# Patient Record
Sex: MALE | Race: WHITE | NOT HISPANIC OR LATINO | Employment: FULL TIME | URBAN - METROPOLITAN AREA
[De-identification: names, ages, dates, MRNs, and addresses within clinical notes are randomized per-mention and may not be internally consistent; named-entity substitution may affect disease eponyms.]

---

## 2017-02-13 RX ORDER — ATORVASTATIN CALCIUM 20 MG/1
TABLET, FILM COATED ORAL
Qty: 30 TAB | Refills: 5 | Status: SHIPPED | OUTPATIENT
Start: 2017-02-13 | End: 2017-09-05

## 2017-07-30 ENCOUNTER — HOSPITAL ENCOUNTER (EMERGENCY)
Facility: MEDICAL CENTER | Age: 37
End: 2017-07-30
Attending: EMERGENCY MEDICINE
Payer: MEDICAID

## 2017-07-30 VITALS
RESPIRATION RATE: 16 BRPM | WEIGHT: 196.43 LBS | BODY MASS INDEX: 29.77 KG/M2 | DIASTOLIC BLOOD PRESSURE: 62 MMHG | HEIGHT: 68 IN | HEART RATE: 101 BPM | TEMPERATURE: 98.1 F | OXYGEN SATURATION: 99 % | SYSTOLIC BLOOD PRESSURE: 120 MMHG

## 2017-07-30 DIAGNOSIS — K42.9 UMBILICAL HERNIA WITHOUT OBSTRUCTION AND WITHOUT GANGRENE: ICD-10-CM

## 2017-07-30 PROCEDURE — 99283 EMERGENCY DEPT VISIT LOW MDM: CPT

## 2017-07-30 RX ORDER — OXYCODONE HYDROCHLORIDE AND ACETAMINOPHEN 5; 325 MG/1; MG/1
1 TABLET ORAL EVERY 6 HOURS PRN
Qty: 15 TAB | Refills: 0 | Status: SHIPPED | OUTPATIENT
Start: 2017-07-30 | End: 2017-09-05

## 2017-07-30 ASSESSMENT — PAIN SCALES - GENERAL: PAINLEVEL_OUTOF10: 4

## 2017-07-30 NOTE — ED NOTES
Pt is  to female in room 58 who has been diagnosed with lice and being treated, doctor notified of relationship.  Charge RN notified of room being placed on terminal clean.

## 2017-07-30 NOTE — ED AVS SNAPSHOT
Home Care Instructions                                                                                                                Rosanna Danielle   MRN: [id number]    Department:  [facility], Emergency Dept   Date of Visit:  7/30/2017            [facility], Emergency Dept    [address] [facility] [address] [address]    Phone:  [phone]      You were seen by     Thalia Reddy M.D.      Your Diagnosis Was     Umbilical hernia without obstruction and without gangrene     K42.9       Follow-up Information     1. Follow up with [facility], Emergency Dept.    Specialty:  Emergency Medicine    Why:  As needed, If symptoms worsen    Contact information    [address] [address] [phone]        2. Follow up with KRYS Gusman.    Specialty:  Family Medicine    Why:  as scheduled on Tuesday    Contact information    [address]  [address] [address] [phone]          3. Follow up with Tobin Lewis M.D.. Schedule an appointment as soon as possible for a visit in 3 days.    Specialties:  Surgery, Radiology    Contact information    [address]  [address]  [facility] [address] [phone]        Medication Information     Review all of your home medications and newly ordered medications with your primary doctor and/or pharmacist as soon as possible. Follow medication instructions as directed by your doctor and/or pharmacist.     Please keep your complete medication list with you and share with your physician. Update the information when medications are discontinued, doses are changed, or new medications (including over-the-counter products) are added; and carry medication information at all times in the event of emergency situations.               Medication List      START taking these medications        Instructions    Morning Afternoon Evening Bedtime    oxycodone-acetaminophen 5-325 MG Tabs   Commonly known as:   PERCOCET        Take 1 Tab by mouth every 6 hours as needed for Severe Pain.   Dose:  1 Tab                          ASK your doctor about these medications        Instructions    Morning Afternoon Evening Bedtime    atorvastatin 20 MG Tabs   Commonly known as:  LIPITOR        TAKE ONE TABLET BY MOUTH ONCE DAILY                        azithromycin 250 MG Tabs   Commonly known as:  ZITHROMAX        Z pack-Take 2 tabs day one and then 1 tab daily for a total of 5 days                        Cholecalciferol 2000 UNIT Caps        Take 1 Cap by mouth every day.   Dose:  2000 Units                        ibuprofen 800 MG Tabs   Commonly known as:  MOTRIN        Take 1 Tab by mouth 2 times a day as needed for Mild Pain, Moderate Pain or Inflammation.   Dose:  800 mg                        tramadol 50 MG Tabs   Commonly known as:  ULTRAM        Take 1 Tab by mouth every 6 hours as needed (Mild pain (Pain score 1-3)).   Dose:  50 mg                             Where to Get Your Medications      You can get these medications from any pharmacy     Bring a paper prescription for each of these medications    - oxycodone-acetaminophen 5-325 MG Tabs              Discharge Instructions       Please follow up with a primary physician for blood pressure management, diabetic screening, and all other preventive health concerns.      RETURN FOR PAIN, VOMITING NOT CONTROLLED BY MEDICATIONS, FEVER, ANY OTHER CONCERN.  See your primary care provider as scheduled on Tuesday  Called Gen. surgery for follow-up regarding hernia repair. Dr. Lewis is on call--please call tomorrow.      Hernia, Adult  A hernia is the bulging of an organ or tissue through a weak spot in the muscles of the abdomen (abdominal wall). Hernias develop most often near the navel or groin.  There are many kinds of hernias. Common kinds include:  · Femoral hernia. This kind of hernia develops under the groin in the upper thigh area.  · Inguinal hernia. This kind of  hernia develops in the groin or scrotum.  · Umbilical hernia. This kind of hernia develops near the navel.  · Hiatal hernia. This kind of hernia causes part of the stomach to be pushed up into the chest.  · Incisional hernia. This kind of hernia bulges through a scar from an abdominal surgery.  CAUSES  This condition may be caused by:  · Heavy lifting.  · Coughing over a long period of time.  · Straining to have a bowel movement.  · An incision made during an abdominal surgery.  · A birth defect (congenital defect).  · Excess weight or obesity.  · Smoking.  · Poor nutrition.  · Cystic fibrosis.  · Excess fluid in the abdomen.  · Undescended testicles.  SYMPTOMS  Symptoms of a hernia include:  · A lump on the abdomen. This is the first sign of a hernia. The lump may become more obvious with standing, straining, or coughing. It may get bigger over time if it is not treated or if the condition causing it is not treated.  · Pain. A hernia is usually painless, but it may become painful over time if treatment is delayed. The pain is usually dull and may get worse with standing or lifting heavy objects.  Sometimes a hernia gets tightly squeezed in the weak spot (strangulated) or stuck there (incarcerated) and causes additional symptoms. These symptoms may include:  · Vomiting.  · Nausea.  · Constipation.  · Irritability.  DIAGNOSIS  A hernia may be diagnosed with:  · A physical exam. During the exam your health care provider may ask you to cough or to make a specific movement, because a hernia is usually more visible when you move.  · Imaging tests. These can include:  ¨ X-rays.  ¨ Ultrasound.  ¨ CT scan.  TREATMENT  A hernia that is small and painless may not need to be treated. A hernia that is large or painful may be treated with surgery. Inguinal hernias may be treated with surgery to prevent incarceration or strangulation. Strangulated hernias are always treated with surgery, because lack of blood to the trapped organ  or tissue can cause it to die.  Surgery to treat a hernia involves pushing the bulge back into place and repairing the weak part of the abdomen.  HOME CARE INSTRUCTIONS  · Avoid straining.  · Do not lift anything heavier than 10 lb (4.5 kg).  · Lift with your leg muscles, not your back muscles. This helps avoid strain.  · When coughing, try to cough gently.  · Prevent constipation. Constipation leads to straining with bowel movements, which can make a hernia worse or cause a hernia repair to break down. You can prevent constipation by:  ¨ Eating a high-fiber diet that includes plenty of fruits and vegetables.  ¨ Drinking enough fluids to keep your urine clear or pale yellow. Aim to drink 6-8 glasses of water per day.  ¨ Using a stool softener as directed by your health care provider.  · Lose weight, if you are overweight.  · Do not use any tobacco products, including cigarettes, chewing tobacco, or electronic cigarettes. If you need help quitting, ask your health care provider.  · Keep all follow-up visits as directed by your health care provider. This is important. Your health care provider may need to monitor your condition.  SEEK MEDICAL CARE IF:  · You have swelling, redness, and pain in the affected area.  · Your bowel habits change.  SEEK IMMEDIATE MEDICAL CARE IF:  · You have a fever.  · You have abdominal pain that is getting worse.  · You feel nauseous or you vomit.  · You cannot push the hernia back in place by gently pressing on it while you are lying down.  · The hernia:  ¨ Changes in shape or size.  ¨ Is stuck outside the abdomen.  ¨ Becomes discolored.  ¨ Feels hard or tender.     This information is not intended to replace advice given to you by your health care provider. Make sure you discuss any questions you have with your health care provider.     Document Released: 12/18/2006 Document Revised: 01/08/2016 Document Reviewed: 10/28/2015  Elsevier Interactive Patient Education ©2016 Elsevier  Inc.              Patient Information     Patient Information    Following emergency treatment: all patient requiring follow-up care must return either to a private physician or a clinic if your condition worsens before you are able to obtain further medical attention, please return to the emergency room.     Billing Information    At [facility], we work to make the billing process streamlined for our patients.  Our Representatives are here to answer any questions you may have regarding your hospital bill.  If you have insurance coverage and have supplied your insurance information to us, we will submit a claim to your insurer on your behalf.  Should you have any questions regarding your bill, we can be reached online or by phone as follows:  Online: You are able pay your bills online or live chat with our representatives about any billing questions you may have. We are here to help Monday - Friday from 8:00am to 7:30pm and 9:00am - 12:00pm on Saturdays.  Please visit [email or web address]  for more information.   Phone:  [phone] or [phone]    Please note that your emergency physician, surgeon, pathologist, radiologist, anesthesiologist, and other specialists are not employed by [facility] and will therefore bill separately for their services.  Please contact them directly for any questions concerning their bills at the numbers below:     Emergency Physician Services:  [phone]  [facility]:  [phone]  [facility]:  [phone]  [facility]:  [phone]    1. Your final bill may vary from the amount quoted upon discharge if all procedures are not complete at that time, or if your doctor has additional procedures of which we are not aware. You will receive an additional bill if you return to the Emergency Department at [facility] for suture removal regardless of the facility of which the sutures were placed.      2. Please arrange for settlement of this account at the emergency registration.    3. All self-pay accounts are due in full at the time of treatment.  If you are unable to meet this obligation then payment is expected within 4-5 days.     4. If you have had radiology studies (CT, X-ray, Ultrasound, MRI), you have received a preliminary result during your emergency department visit. Please contact the radiology department [phone] to receive a copy of your final result. Please discuss the Final result with your primary physician or with the follow up physician provided.     Crisis Hotline:  [place] Crisis Hotline:  7-303-JNQNJGD or 1-201.112.3428  [place] Crisis Hotline:    1-588.914.9092 or 381-965-9945         ED Discharge Follow Up Questions    1. In order to provide you with very good care, we would like to follow up with a phone call in the next few days.  May we have your permission to contact you?     YES /  NO    2. What is the best phone number to call you? (       )_____-__________    3. What is the best time to call you?      Morning  /  Afternoon  /  Evening                   Patient Signature:  ____________________________________________________________    Date:  ____________________________________________________________

## 2017-07-30 NOTE — DISCHARGE INSTRUCTIONS
Please follow up with a primary physician for blood pressure management, diabetic screening, and all other preventive health concerns.      RETURN FOR PAIN, VOMITING NOT CONTROLLED BY MEDICATIONS, FEVER, ANY OTHER CONCERN.  See your primary care provider as scheduled on Tuesday  Called Gen. surgery for follow-up regarding hernia repair. Dr. Lewis is on call--please call tomorrow.      Hernia, Adult  A hernia is the bulging of an organ or tissue through a weak spot in the muscles of the abdomen (abdominal wall). Hernias develop most often near the navel or groin.  There are many kinds of hernias. Common kinds include:  · Femoral hernia. This kind of hernia develops under the groin in the upper thigh area.  · Inguinal hernia. This kind of hernia develops in the groin or scrotum.  · Umbilical hernia. This kind of hernia develops near the navel.  · Hiatal hernia. This kind of hernia causes part of the stomach to be pushed up into the chest.  · Incisional hernia. This kind of hernia bulges through a scar from an abdominal surgery.  CAUSES  This condition may be caused by:  · Heavy lifting.  · Coughing over a long period of time.  · Straining to have a bowel movement.  · An incision made during an abdominal surgery.  · A birth defect (congenital defect).  · Excess weight or obesity.  · Smoking.  · Poor nutrition.  · Cystic fibrosis.  · Excess fluid in the abdomen.  · Undescended testicles.  SYMPTOMS  Symptoms of a hernia include:  · A lump on the abdomen. This is the first sign of a hernia. The lump may become more obvious with standing, straining, or coughing. It may get bigger over time if it is not treated or if the condition causing it is not treated.  · Pain. A hernia is usually painless, but it may become painful over time if treatment is delayed. The pain is usually dull and may get worse with standing or lifting heavy objects.  Sometimes a hernia gets tightly squeezed in the weak spot (strangulated) or stuck  there (incarcerated) and causes additional symptoms. These symptoms may include:  · Vomiting.  · Nausea.  · Constipation.  · Irritability.  DIAGNOSIS  A hernia may be diagnosed with:  · A physical exam. During the exam your health care provider may ask you to cough or to make a specific movement, because a hernia is usually more visible when you move.  · Imaging tests. These can include:  ¨ X-rays.  ¨ Ultrasound.  ¨ CT scan.  TREATMENT  A hernia that is small and painless may not need to be treated. A hernia that is large or painful may be treated with surgery. Inguinal hernias may be treated with surgery to prevent incarceration or strangulation. Strangulated hernias are always treated with surgery, because lack of blood to the trapped organ or tissue can cause it to die.  Surgery to treat a hernia involves pushing the bulge back into place and repairing the weak part of the abdomen.  HOME CARE INSTRUCTIONS  · Avoid straining.  · Do not lift anything heavier than 10 lb (4.5 kg).  · Lift with your leg muscles, not your back muscles. This helps avoid strain.  · When coughing, try to cough gently.  · Prevent constipation. Constipation leads to straining with bowel movements, which can make a hernia worse or cause a hernia repair to break down. You can prevent constipation by:  ¨ Eating a high-fiber diet that includes plenty of fruits and vegetables.  ¨ Drinking enough fluids to keep your urine clear or pale yellow. Aim to drink 6-8 glasses of water per day.  ¨ Using a stool softener as directed by your health care provider.  · Lose weight, if you are overweight.  · Do not use any tobacco products, including cigarettes, chewing tobacco, or electronic cigarettes. If you need help quitting, ask your health care provider.  · Keep all follow-up visits as directed by your health care provider. This is important. Your health care provider may need to monitor your condition.  SEEK MEDICAL CARE IF:  · You have swelling,  redness, and pain in the affected area.  · Your bowel habits change.  SEEK IMMEDIATE MEDICAL CARE IF:  · You have a fever.  · You have abdominal pain that is getting worse.  · You feel nauseous or you vomit.  · You cannot push the hernia back in place by gently pressing on it while you are lying down.  · The hernia:  ¨ Changes in shape or size.  ¨ Is stuck outside the abdomen.  ¨ Becomes discolored.  ¨ Feels hard or tender.     This information is not intended to replace advice given to you by your health care provider. Make sure you discuss any questions you have with your health care provider.     Document Released: 12/18/2006 Document Revised: 01/08/2016 Document Reviewed: 10/28/2015  [employer] Interactive Patient Education ©2016 Elsevier Inc.

## 2017-07-30 NOTE — ED AVS SNAPSHOT
Tomorrowish Access Code: Activation code not generated  Current Tomorrowish Status: Active    [employer]  A secure, online tool to manage your health information     [employer]’s Tomorrowish® is a secure, online tool that connects you to your personalized health information from the privacy of your home -- day or night - making it very easy for you to manage your healthcare. Once the activation process is completed, you can even access your medical information using the Tomorrowish haris, which is available for free in the Apple Haris store or Google Play store.     Tomorrowish provides the following levels of access (as shown below):   My Chart Features   [facility] Primary Care Doctor [facility]  Specialists [facility]  Urgent  Care Non-[facility]  Primary Care  Doctor   Email your healthcare team securely and privately 24/7 X X X X   Manage appointments: schedule your next appointment; view details of past/upcoming appointments X      Request prescription refills. X      View recent personal medical records, including lab and immunizations X X X X   View health record, including health history, allergies, medications X X X X   Read reports about your outpatient visits, procedures, consult and ER notes X X X X   See your discharge summary, which is a recap of your hospital and/or ER visit that includes your diagnosis, lab results, and care plan. X X       How to register for Tomorrowish:  1. Go to  [email or web address].  2. Click on the Sign Up Now box, which takes you to the New Member Sign Up page. You will need to provide the following information:  a. Enter your Tomorrowish Access Code exactly as it appears at the top of this page. (You will not need to use this code after you’ve completed the sign-up process. If you do not sign up before the expiration date, you must request a new code.)   b. Enter your date of birth.   c. Enter your home email address.   d. Click Submit, and follow the next screen’s instructions.  3. Create a Tomorrowish ID. This will  be your Sitesimon login ID and cannot be changed, so think of one that is secure and easy to remember.  4. Create a Sitesimon password. You can change your password at any time.  5. Enter your Password Reset Question and Answer. This can be used at a later time if you forget your password.   6. Enter your e-mail address. This allows you to receive e-mail notifications when new information is available in Sitesimon.  7. Click Sign Up. You can now view your health information.    For assistance activating your Sitesimon account, call [phone]

## 2017-07-30 NOTE — ED AVS SNAPSHOT
7/30/2017    Rosanna Danielle  [address]  [place] [address]    Dear Rosanna:    [facility] wants to ensure your discharge home is safe and you or your loved ones have had all of your questions answered regarding your care after you leave the hospital.    Below is a list of resources and contact information should you have any questions regarding your hospital stay, follow-up instructions, or active medical symptoms.    Questions or Concerns Regarding… Contact   Medical Questions Related to Your Discharge  (7 days a week, 8am-5pm) Contact a Nurse Care Coordinator   [phone]   Medical Questions Not Related to Your Discharge  (24 hours a day / 7 days a week)  Contact the Nurse Health Line   [phone]    Medications or Discharge Instructions Refer to your discharge packet   or contact your [facility] Primary Care Provider   [phone]   Follow-up Appointment(s) Schedule your appointment via [employer]   or contact Scheduling [phone]   Billing Review your statement via [employer]  or contact Billing [phone]   Medical Records Review your records via [employer]   or contact Medical Records [phone]     You may receive a telephone call within two days of discharge. This call is to make certain you understand your discharge instructions and have the opportunity to have any questions answered. You can also easily access your medical information, test results and upcoming appointments via the [employer] free online health management tool. You can learn more and sign up at Listiki/[employer]. For assistance setting up your [employer] account, please call [phone].    Once again, we want to ensure your discharge home is safe and that you have a clear understanding of any next steps in your care. If you have any questions or concerns, please do not hesitate to contact us, we are here for you. Thank you for choosing [facility] for your healthcare needs.    Sincerely,    Your [facility] Healthcare Team

## 2017-07-30 NOTE — ED NOTES
Pt ambulatory to triage c/o umbilical hernia x 1 month, progressively worse pain.  Pt states he was seen at an Urgent Care for same.
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
## 2017-09-05 ENCOUNTER — APPOINTMENT (OUTPATIENT)
Dept: ADMISSIONS | Facility: MEDICAL CENTER | Age: 37
End: 2017-09-05
Attending: SURGERY
Payer: MEDICAID

## 2017-09-05 RX ORDER — UBIDECARENONE 75 MG
100 CAPSULE ORAL DAILY
COMMUNITY

## 2017-09-13 ENCOUNTER — HOSPITAL ENCOUNTER (OUTPATIENT)
Facility: MEDICAL CENTER | Age: 37
End: 2017-09-13
Attending: SURGERY | Admitting: SURGERY
Payer: MEDICAID

## 2017-09-13 VITALS
SYSTOLIC BLOOD PRESSURE: 102 MMHG | HEIGHT: 68 IN | TEMPERATURE: 98.1 F | HEART RATE: 87 BPM | DIASTOLIC BLOOD PRESSURE: 61 MMHG | OXYGEN SATURATION: 97 % | BODY MASS INDEX: 30.67 KG/M2 | WEIGHT: 202.38 LBS | RESPIRATION RATE: 12 BRPM

## 2017-09-13 PROBLEM — K42.9 UMBILICAL HERNIA WITHOUT MENTION OF OBSTRUCTION OR GANGRENE: Status: RESOLVED | Noted: 2017-09-13 | Resolved: 2017-09-13

## 2017-09-13 PROBLEM — K42.9 UMBILICAL HERNIA WITHOUT MENTION OF OBSTRUCTION OR GANGRENE: Status: ACTIVE | Noted: 2017-09-13

## 2017-09-13 PROCEDURE — 160009 HCHG ANES TIME/MIN: Performed by: SURGERY

## 2017-09-13 PROCEDURE — 501838 HCHG SUTURE GENERAL: Performed by: SURGERY

## 2017-09-13 PROCEDURE — 160027 HCHG SURGERY MINUTES - 1ST 30 MINS LEVEL 2: Performed by: SURGERY

## 2017-09-13 PROCEDURE — 700101 HCHG RX REV CODE 250

## 2017-09-13 PROCEDURE — 160035 HCHG PACU - 1ST 60 MINS PHASE I: Performed by: SURGERY

## 2017-09-13 PROCEDURE — 160046 HCHG PACU - 1ST 60 MINS PHASE II: Performed by: SURGERY

## 2017-09-13 PROCEDURE — 160048 HCHG OR STATISTICAL LEVEL 1-5: Performed by: SURGERY

## 2017-09-13 PROCEDURE — 700111 HCHG RX REV CODE 636 W/ 250 OVERRIDE (IP)

## 2017-09-13 PROCEDURE — 160002 HCHG RECOVERY MINUTES (STAT): Performed by: SURGERY

## 2017-09-13 PROCEDURE — 700102 HCHG RX REV CODE 250 W/ 637 OVERRIDE(OP)

## 2017-09-13 PROCEDURE — 160036 HCHG PACU - EA ADDL 30 MINS PHASE I: Performed by: SURGERY

## 2017-09-13 PROCEDURE — 160025 RECOVERY II MINUTES (STATS): Performed by: SURGERY

## 2017-09-13 PROCEDURE — 160038 HCHG SURGERY MINUTES - EA ADDL 1 MIN LEVEL 2: Performed by: SURGERY

## 2017-09-13 PROCEDURE — A9270 NON-COVERED ITEM OR SERVICE: HCPCS

## 2017-09-13 PROCEDURE — C1781 MESH (IMPLANTABLE): HCPCS | Performed by: SURGERY

## 2017-09-13 PROCEDURE — 501411 HCHG SPONGE, BABY LAP W/O RINGS: Performed by: SURGERY

## 2017-09-13 DEVICE — MESH VENTRALEX ST W/STRAP - 4.3CM SMALL (1EA/CA): Type: IMPLANTABLE DEVICE | Status: FUNCTIONAL

## 2017-09-13 RX ORDER — OXYCODONE HCL 5 MG/5 ML
SOLUTION, ORAL ORAL
Status: COMPLETED
Start: 2017-09-13 | End: 2017-09-13

## 2017-09-13 RX ORDER — ONDANSETRON 2 MG/ML
INJECTION INTRAMUSCULAR; INTRAVENOUS
Status: COMPLETED
Start: 2017-09-13 | End: 2017-09-13

## 2017-09-13 RX ORDER — SODIUM CHLORIDE, SODIUM LACTATE, POTASSIUM CHLORIDE, CALCIUM CHLORIDE 600; 310; 30; 20 MG/100ML; MG/100ML; MG/100ML; MG/100ML
1000 INJECTION, SOLUTION INTRAVENOUS
Status: COMPLETED | OUTPATIENT
Start: 2017-09-13 | End: 2017-09-13

## 2017-09-13 RX ORDER — LIDOCAINE HYDROCHLORIDE 10 MG/ML
INJECTION, SOLUTION INFILTRATION; PERINEURAL
Status: COMPLETED
Start: 2017-09-13 | End: 2017-09-13

## 2017-09-13 RX ORDER — BUPIVACAINE HYDROCHLORIDE 5 MG/ML
INJECTION, SOLUTION EPIDURAL; INTRACAUDAL
Status: DISCONTINUED | OUTPATIENT
Start: 2017-09-13 | End: 2017-09-13 | Stop reason: HOSPADM

## 2017-09-13 RX ORDER — EPINEPHRINE 1 MG/ML(1)
AMPUL (ML) INJECTION
Status: DISCONTINUED | OUTPATIENT
Start: 2017-09-13 | End: 2017-09-13 | Stop reason: HOSPADM

## 2017-09-13 RX ADMIN — OXYCODONE HYDROCHLORIDE 10 MG: 5 SOLUTION ORAL at 08:08

## 2017-09-13 RX ADMIN — FENTANYL CITRATE 50 MCG: 50 INJECTION, SOLUTION INTRAMUSCULAR; INTRAVENOUS at 08:09

## 2017-09-13 RX ADMIN — LIDOCAINE HYDROCHLORIDE 0.2 ML: 10 INJECTION, SOLUTION INFILTRATION; PERINEURAL at 06:45

## 2017-09-13 RX ADMIN — FENTANYL CITRATE 25 MCG: 50 INJECTION, SOLUTION INTRAMUSCULAR; INTRAVENOUS at 08:16

## 2017-09-13 RX ADMIN — SODIUM CHLORIDE, SODIUM LACTATE, POTASSIUM CHLORIDE, CALCIUM CHLORIDE 1000 ML: 600; 310; 30; 20 INJECTION, SOLUTION INTRAVENOUS at 06:58

## 2017-09-13 RX ADMIN — ONDANSETRON 4 MG: 2 INJECTION INTRAMUSCULAR; INTRAVENOUS at 08:05

## 2017-09-13 ASSESSMENT — PAIN SCALES - GENERAL
PAINLEVEL_OUTOF10: 4
PAINLEVEL_OUTOF10: 8
PAINLEVEL_OUTOF10: 0
PAINLEVEL_OUTOF10: 0
PAINLEVEL_OUTOF10: ASSUMED PAIN PRESENT
PAINLEVEL_OUTOF10: 0
PAINLEVEL_OUTOF10: 0
PAINLEVEL_OUTOF10: 6

## 2017-09-13 NOTE — OR NURSING
0920: D/Hernandez to care of family post uneventful stay in PACU 2. Pt c/o tolerable pain in abdo area, but also pain in recent surgical site to leg. Sent home with copy of Dr Ritchie's Umbilical Hernia instructions.

## 2017-09-13 NOTE — DISCHARGE INSTRUCTIONS
ACTIVITY: Rest and take it easy for the first 24 hours.  A responsible adult is recommended to remain with you during that time.  It is normal to feel sleepy.  We encourage you to not do anything that requires balance, judgment or coordination.    MILD FLU-LIKE SYMPTOMS ARE NORMAL. YOU MAY EXPERIENCE GENERALIZED MUSCLE ACHES, THROAT IRRITATION, HEADACHE AND/OR SOME NAUSEA.    FOR 24 HOURS DO NOT:  Drive, operate machinery or run household appliances.  Drink beer or alcoholic beverages.   Make important decisions or sign legal documents.    SPECIAL INSTRUCTIONS: Avoid heavy lifting until follow up appt.    DIET: To avoid nausea, slowly advance diet as tolerated, avoiding spicy or greasy foods for the first day.  Add more substantial food to your diet according to your physician's instructions.  Babies can be fed formula or breast milk as soon as they are hungry.  INCREASE FLUIDS AND FIBER TO AVOID CONSTIPATION.    SURGICAL DRESSING/BATHING: Keep dressing clean dry and intact until further instruction from physician.     FOLLOW-UP APPOINTMENT:  A follow-up appointment should be arranged with your doctor as scheduled.    You should CALL YOUR PHYSICIAN if you develop:  Fever greater than 101 degrees F.  Pain not relieved by medication, or persistent nausea or vomiting.  Excessive bleeding (blood soaking through dressing) or unexpected drainage from the wound.  Extreme redness or swelling around the incision site, drainage of pus or foul smelling drainage.  Inability to urinate or empty your bladder within 8 hours.  Problems with breathing or chest pain.    You should call 911 if you develop problems with breathing or chest pain.  If you are unable to contact your doctor or surgical center, you should go to the nearest emergency room or urgent care center.  Physician's telephone #: [phone]    If any questions arise, call your doctor.  If your doctor is not available, please feel free to call the Surgical Center at  [phone].  The Center is open Monday through Friday from 7AM to 7PM.  You can also call the HEALTH HOTLINE open 24 hours/day, 7 days/week and speak to a nurse at [phone], or toll free at [phone].    A registered nurse may call you a few days after your surgery to see how you are doing after your procedure.    MEDICATIONS: Resume taking daily medication.  Take prescribed pain medication with food.  If no medication is prescribed, you may take non-aspirin pain medication if needed.  PAIN MEDICATION CAN BE VERY CONSTIPATING.  Take a stool softener or laxative such as senokot, pericolace, or milk of magnesia if needed.    Prescription given for N/A.  Last pain medication given at 0800am (Oxycodone 10mg).    If your physician has prescribed pain medication that includes Acetaminophen (Tylenol), do not take additional Acetaminophen (Tylenol) while taking the prescribed medication.    Depression / Suicide Risk    As you are discharged from this [facility] facility, it is important to learn how to keep safe from harming yourself.    Recognize the warning signs:  · Abrupt changes in personality, positive or negative- including increase in energy   · Giving away possessions  · Change in eating patterns- significant weight changes-  positive or negative  · Change in sleeping patterns- unable to sleep or sleeping all the time   · Unwillingness or inability to communicate  · Depression  · Unusual sadness, discouragement and loneliness  · Talk of wanting to die  · Neglect of personal appearance   · Rebelliousness- reckless behavior  · Withdrawal from people/activities they love  · Confusion- inability to concentrate     If you or a loved one observes any of these behaviors or has concerns about self-harm, here's what you can do:  · Talk about it- your feelings and reasons for harming yourself  · Remove any means that you might use to hurt yourself (examples: pills, rope, extension cords, firearm)  · Get  professional help from the community (Mental Health, Substance Abuse, psychological counseling)  · Do not be alone:Call your Safe Contact- someone whom you trust who will be there for you.  · Call your local CRISIS HOTLINE 004-2272 or 122-005-2356  · Call your local Children's Mobile Crisis Response Team [place] (650) 076-8843 or www.eCurv  · Call the toll free National Suicide Prevention Hotlines   · National Suicide Prevention Lifeline 708-150-ZWTK (4520)  · National Hope Line Network 800-SUICIDE (043-3217)

## 2017-09-13 NOTE — PROGRESS NOTES
Umbilical Hernia Repair Discharge Instructions:     1. ACTIVITIES: Upon discharge from the hospital, the day of surgery it is requested that you do no significant physical activity and limit mental activities, as you have had sedation. The day after surgery, you may resume activities of daily living, but for four weeks, it is recommended that you do no strenuous activities or heavy lifting (greater than 15 pounds).     2. DRIVING: You may drive whenever you are off pain medications and are able to perform the activities needed to drive, i.e. turning, bending, twisting, etc.     3. WOUND: It is not unusual for patients to experience swelling and even bruising, as well as a small amount of drainage at the hernia repair site. This is normal and will resolve over the next few days.     4. ICE: please use ice on the wound to decrease the swelling for the first 24 hours and then discontinue.     5. BATHING: The dressing can be removed two days after surgery and the wound can then be wetted in a shower as normal, but avoid submersion in water (tub bath) for at least a week. OK to shower with dressing in place.     6. PAIN MEDICATION: You will be given a prescription for pain medication at discharge. Please take these as directed. It is important to remember not to take medications on an empty stomach as this may cause nausea.     7. BOWEL FUNCTION: After hernia repair, it is not uncommon for patients to experience constipation. This is due to decreasing activity levels as well as pain medications. You may wish to use a stool softener beginning immediately after surgery, and you may or may not need to use a laxative (Milk of Magnesia, Ex-lax; Senokot, etc.) as well.     8 .CALL IF YOU HAVE: (1) Fevers to more than 1010 F, (2) Unusual chest or leg pain, (3) Drainage or fluid from incision that may be foul smelling, increased tenderness or soreness at the wound or the wound edges are no longer together,redness or swelling at  the incision site. Please do not hesitate to call with any other questions.     9. APPOINTMENT: Contact our office at [phone] for a follow-up appointment in 1 to 2 weeks following your procedure.     If you have any additional questions, please do not hesitate to call the office and speak to either myself or the physician on call.     Office addresses:   [address] [place] [address]   [address] [facility] [facility] [address] [place] [address]   Karlene Ritchie M.D.   [facility]   [phone]

## 2017-09-13 NOTE — H&P
Surgical History and Physical    Date: 9/13/2017    Requesting Physician: [unfilled]  PCP: Pcp Pt States None  Attending Physician: Karlene Ritchie M.D. [facility]    HPI: Rosanna Danielle has a symptomatic umbilical hernia, here for repair.     Past Medical History:   Diagnosis Date   • Bronchitis    • High cholesterol        Past Surgical History:   Procedure Laterality Date   • TIBIA PLATEAU ORIF Left 9/2/2016    Procedure: TIBIA PLATEAU ORIF, EXTERNAL FIXATOR REMOVAL;  Surgeon: Marcin Laws M.D.;  Location: SURGERY [facility];  Service:    • EXTERNAL FIXATOR APPLICATION Left 9/1/2016    Procedure: LEFT CLOSED REDUCTION TIBIA FRACTURE , PLACEMENT OF EXTERNAL FIXATOR;  Surgeon: David Ogden M.D.;  Location: SURGERY [facility];  Service:        No current facility-administered medications for this encounter.        Social History     Social History   • Marital status:      Spouse name: N/A   • Number of children: N/A   • Years of education: N/A     Occupational History   • Not on file.     Social History Main Topics   • Smoking status: Current Every Day Smoker     Packs/day: 1.00     Years: 17.00     Types: Cigarettes     Start date: 9/2/1999   • Smokeless tobacco: Never Used   • Alcohol use 1.8 oz/week     3 Standard drinks or equivalent per week   • Drug use: No      Comment: meth    • Sexual activity: Yes     Other Topics Concern   • Not on file     Social History Narrative   • No narrative on file       Family History   Problem Relation Age of Onset   • Alcohol/Drug Mother    • Diabetes Maternal Aunt    • Heart Disease Paternal Aunt    • Arthritis Maternal Grandmother    • Cancer Maternal Grandmother    • Hypertension Maternal Grandmother    • Hyperlipidemia Maternal Grandmother    • Stroke Maternal Grandmother    • Arthritis Paternal Grandmother        Allergies:  Apricot flavor; Food; and Peach [prunus persica]    Review of Systems:  Constitutional: Negative for fever,  "chills, weight loss, malaise/fatigue and diaphoresis.   HENT: Negative for hearing loss, ear pain, nosebleeds, congestion, sore throat, neck pain, tinnitus and ear discharge.    Eyes: Negative for blurred vision, double vision, photophobia, pain, discharge and redness.   Respiratory: Negative for cough, hemoptysis, sputum production, shortness of breath, wheezing and stridor.    Cardiovascular: Negative for chest pain, palpitations, orthopnea, claudication, leg swelling and PND.   Gastrointestinal: Negative for heartburn, nausea, vomiting, abdominal pain, diarrhea, constipation, blood in stool and melena.   Genitourinary: Negative for dysuria, urgency, frequency, hematuria and flank pain.   Musculoskeletal: Negative for myalgias, back pain, joint pain and falls.   Skin: Negative for itching and rash.  Neurological: Negative for dizziness, tingling, tremors, sensory change, speech change, focal weakness, seizures, loss of consciousness, weakness and headaches.   Endo/Heme/Allergies: Negative for environmental allergies and polydipsia. Does not bruise/bleed easily.   Psychiatric/Behavioral: Negative for depression, suicidal ideas, hallucinations, memory loss and substance abuse. The patient is not nervous/anxious and does not have insomnia.    Physical Exam:  Blood pressure 131/84, pulse 87, temperature 36.9 °C (98.4 °F), resp. rate 18, height 1.727 m (5' 7.99\"), weight 91.8 kg (202 lb 6.1 oz), SpO2 97 %.    Constitutional: he is oriented to person, place, and time.  he appears well-developed and well-nourished. No distress.   Head: Normocephalic and atraumatic.   Neck: Normal range of motion. Neck supple. No JVD present. No tracheal deviation present. No thyromegaly present.   Cardiovascular: Normal rate, regular rhythm, normal heart sounds and intact distal pulses.  Exam reveals no gallop and no friction rub.  No murmur heard.  Pulmonary/Chest: Effort normal and breath sounds normal. No stridor. No respiratory " distress. he has no wheezes. he has no rales.   Abdominal: Soft. Bowel sounds are normal. he exhibits no distension and no mass. There is no tenderness. There is no rebound and no guarding. +UHR  Musculoskeletal: Normal range of motion. he exhibits no edema and no tenderness.   Neurological: he is alert and oriented to person, place, and time. he has normal reflexes. No cranial nerve deficit. Coordination normal.   Skin: Skin is warm and dry. No rash noted. he is not diaphoretic. No erythema. No pallor.   Psychiatric: he has a normal mood and affect.  Behavior is normal.       Assessment: This is a 37 y.o. male with an umbilical hernia, symptomatic.    Plan: Umbilical hernia repair.    Thank you very much for this consultation.    Karlene Ritchie M.D.  [facility]  [phone]

## 2017-09-13 NOTE — OR NURSING
0756- pt to pacu via gurney with side rails up.  VSS.  Pt awake, agitated. Midline abd gauze and tegaderm dressing cdi.  C/o nausea, see mar for zofran given. L lower leg with ace wrap from hardware removal surgury on 9/11/17.  0800- Pt c/o pain, see mar for meds given.  0815- Pt more calm, relaxed.    0826- Vss.  Pt denies pain, nausea.  0842- wife updated via phone.  0854- Pt meets criteria for stage 2, report to Kathy ANAYA.

## 2018-04-05 ENCOUNTER — APPOINTMENT (OUTPATIENT)
Dept: RADIOLOGY | Facility: MEDICAL CENTER | Age: 38
End: 2018-04-05
Attending: EMERGENCY MEDICINE
Payer: COMMERCIAL

## 2018-04-05 ENCOUNTER — HOSPITAL ENCOUNTER (EMERGENCY)
Facility: MEDICAL CENTER | Age: 38
End: 2018-04-05
Attending: EMERGENCY MEDICINE
Payer: COMMERCIAL

## 2018-04-05 VITALS
TEMPERATURE: 98.6 F | SYSTOLIC BLOOD PRESSURE: 128 MMHG | HEART RATE: 78 BPM | DIASTOLIC BLOOD PRESSURE: 71 MMHG | WEIGHT: 180 LBS | OXYGEN SATURATION: 94 % | BODY MASS INDEX: 27.28 KG/M2 | HEIGHT: 68 IN | RESPIRATION RATE: 13 BRPM

## 2018-04-05 DIAGNOSIS — R55 SYNCOPE, UNSPECIFIED SYNCOPE TYPE: ICD-10-CM

## 2018-04-05 DIAGNOSIS — F10.920 ALCOHOLIC INTOXICATION WITHOUT COMPLICATION (HCC): ICD-10-CM

## 2018-04-05 LAB
ALBUMIN SERPL BCP-MCNC: 4.3 G/DL (ref 3.2–4.9)
ALBUMIN/GLOB SERPL: 1.3 G/DL
ALP SERPL-CCNC: 74 U/L (ref 30–99)
ALT SERPL-CCNC: 44 U/L (ref 2–50)
AMPHET UR QL SCN: NEGATIVE
ANION GAP SERPL CALC-SCNC: 8 MMOL/L (ref 0–11.9)
AST SERPL-CCNC: 28 U/L (ref 12–45)
BARBITURATES UR QL SCN: NEGATIVE
BASOPHILS # BLD AUTO: 0.5 % (ref 0–1.8)
BASOPHILS # BLD: 0.04 K/UL (ref 0–0.12)
BENZODIAZ UR QL SCN: NEGATIVE
BILIRUB SERPL-MCNC: 0.2 MG/DL (ref 0.1–1.5)
BUN SERPL-MCNC: 13 MG/DL (ref 8–22)
BZE UR QL SCN: NEGATIVE
CALCIUM SERPL-MCNC: 9.6 MG/DL (ref 8.5–10.5)
CANNABINOIDS UR QL SCN: NEGATIVE
CHLORIDE SERPL-SCNC: 108 MMOL/L (ref 96–112)
CO2 SERPL-SCNC: 25 MMOL/L (ref 20–33)
CREAT SERPL-MCNC: 0.99 MG/DL (ref 0.5–1.4)
EKG IMPRESSION: NORMAL
EKG IMPRESSION: NORMAL
EOSINOPHIL # BLD AUTO: 0.13 K/UL (ref 0–0.51)
EOSINOPHIL NFR BLD: 1.6 % (ref 0–6.9)
ERYTHROCYTE [DISTWIDTH] IN BLOOD BY AUTOMATED COUNT: 44.3 FL (ref 35.9–50)
ETHANOL BLD-MCNC: 0.21 G/DL
GLOBULIN SER CALC-MCNC: 3.2 G/DL (ref 1.9–3.5)
GLUCOSE SERPL-MCNC: 99 MG/DL (ref 65–99)
HCT VFR BLD AUTO: 43.7 % (ref 42–52)
HGB BLD-MCNC: 14.9 G/DL (ref 14–18)
IMM GRANULOCYTES # BLD AUTO: 0.02 K/UL (ref 0–0.11)
IMM GRANULOCYTES NFR BLD AUTO: 0.2 % (ref 0–0.9)
INR PPP: 1.04 (ref 0.87–1.13)
LYMPHOCYTES # BLD AUTO: 2.74 K/UL (ref 1–4.8)
LYMPHOCYTES NFR BLD: 33.7 % (ref 22–41)
MCH RBC QN AUTO: 32.3 PG (ref 27–33)
MCHC RBC AUTO-ENTMCNC: 34.1 G/DL (ref 33.7–35.3)
MCV RBC AUTO: 94.6 FL (ref 81.4–97.8)
METHADONE UR QL SCN: NEGATIVE
MONOCYTES # BLD AUTO: 0.69 K/UL (ref 0–0.85)
MONOCYTES NFR BLD AUTO: 8.5 % (ref 0–13.4)
NEUTROPHILS # BLD AUTO: 4.51 K/UL (ref 1.82–7.42)
NEUTROPHILS NFR BLD: 55.5 % (ref 44–72)
NRBC # BLD AUTO: 0 K/UL
NRBC BLD-RTO: 0 /100 WBC
OPIATES UR QL SCN: POSITIVE
OXYCODONE UR QL SCN: NEGATIVE
PCP UR QL SCN: NEGATIVE
PLATELET # BLD AUTO: 330 K/UL (ref 164–446)
PMV BLD AUTO: 10.7 FL (ref 9–12.9)
POTASSIUM SERPL-SCNC: 3.5 MMOL/L (ref 3.6–5.5)
PROPOXYPH UR QL SCN: NEGATIVE
PROT SERPL-MCNC: 7.5 G/DL (ref 6–8.2)
PROTHROMBIN TIME: 13.3 SEC (ref 12–14.6)
RBC # BLD AUTO: 4.62 M/UL (ref 4.7–6.1)
SODIUM SERPL-SCNC: 141 MMOL/L (ref 135–145)
TROPONIN I SERPL-MCNC: <0.01 NG/ML (ref 0–0.04)
TROPONIN I SERPL-MCNC: <0.01 NG/ML (ref 0–0.04)
WBC # BLD AUTO: 8.1 K/UL (ref 4.8–10.8)

## 2018-04-05 PROCEDURE — 94760 N-INVAS EAR/PLS OXIMETRY 1: CPT

## 2018-04-05 PROCEDURE — 93005 ELECTROCARDIOGRAM TRACING: CPT | Performed by: EMERGENCY MEDICINE

## 2018-04-05 PROCEDURE — 85025 COMPLETE CBC W/AUTO DIFF WBC: CPT

## 2018-04-05 PROCEDURE — 93005 ELECTROCARDIOGRAM TRACING: CPT

## 2018-04-05 PROCEDURE — 71045 X-RAY EXAM CHEST 1 VIEW: CPT

## 2018-04-05 PROCEDURE — A9270 NON-COVERED ITEM OR SERVICE: HCPCS | Performed by: EMERGENCY MEDICINE

## 2018-04-05 PROCEDURE — 96372 THER/PROPH/DIAG INJ SC/IM: CPT

## 2018-04-05 PROCEDURE — 85610 PROTHROMBIN TIME: CPT

## 2018-04-05 PROCEDURE — 700111 HCHG RX REV CODE 636 W/ 250 OVERRIDE (IP): Performed by: EMERGENCY MEDICINE

## 2018-04-05 PROCEDURE — 84484 ASSAY OF TROPONIN QUANT: CPT

## 2018-04-05 PROCEDURE — 80053 COMPREHEN METABOLIC PANEL: CPT

## 2018-04-05 PROCEDURE — 36415 COLL VENOUS BLD VENIPUNCTURE: CPT

## 2018-04-05 PROCEDURE — 700102 HCHG RX REV CODE 250 W/ 637 OVERRIDE(OP): Performed by: EMERGENCY MEDICINE

## 2018-04-05 PROCEDURE — 80307 DRUG TEST PRSMV CHEM ANLYZR: CPT

## 2018-04-05 PROCEDURE — 99285 EMERGENCY DEPT VISIT HI MDM: CPT

## 2018-04-05 RX ORDER — KETOROLAC TROMETHAMINE 30 MG/ML
15 INJECTION, SOLUTION INTRAMUSCULAR; INTRAVENOUS ONCE
Status: COMPLETED | OUTPATIENT
Start: 2018-04-05 | End: 2018-04-05

## 2018-04-05 RX ORDER — KETOROLAC TROMETHAMINE 30 MG/ML
15 INJECTION, SOLUTION INTRAMUSCULAR; INTRAVENOUS ONCE
Status: DISCONTINUED | OUTPATIENT
Start: 2018-04-05 | End: 2018-04-05

## 2018-04-05 RX ADMIN — KETOROLAC TROMETHAMINE 15 MG: 30 INJECTION, SOLUTION INTRAMUSCULAR; INTRAVENOUS at 18:11

## 2018-04-05 RX ADMIN — LIDOCAINE HYDROCHLORIDE 30 ML: 20 SOLUTION OROPHARYNGEAL at 18:11

## 2018-04-05 ASSESSMENT — PAIN SCALES - GENERAL: PAINLEVEL_OUTOF10: ASSUMED PAIN PRESENT

## 2018-04-05 NOTE — ED TRIAGE NOTES
"Chief Complaint   Patient presents with   • Chest Pain   • Syncope   • Alcohol Intoxication     Patient bib LUISA, states he tried to stop drinking for the past 3 days, however today he took 6 shots back to back of \"  99 Grapes\" alcohol, and 2 Vicodin. Wife said she heard him pass out when getting out of his shower. Patient now awake, c/o chest pain and left knee pain. EKG completed. ERP to eval.    "

## 2018-04-05 NOTE — ED PROVIDER NOTES
"CHIEF COMPLAINT  Chief Complaint   Patient presents with   • Chest Pain   • Syncope   • Alcohol Intoxication       HPI  Rosanna Danielle is a 37 y.o. male who presents with epigastric and central chest pain with syncopal episode earlier today. States that he has had one other episode of syncope approximately 2 weeks ago. States he was not drinking at that time. Denies prior cardiac history. Denies any trauma as a result of the syncopal episode. Significant other at bedside states that he passed out and slept on the floor. The patient does admit to drinking 8 shots of 99 proof alcohol since coming home from work this morning at around 9 AM. Also took 2 Vicodin for chronic knee pain to the left. The patient is slurring his speech currently. Denies nausea or vomiting.      REVIEW OF SYSTEMS  See HPI for further details. All other systems are negative.     PAST MEDICAL HISTORY   has a past medical history of Bronchitis and High cholesterol.    SOCIAL HISTORY  Social History     Social History Main Topics   • Smoking status: Current Every Day Smoker     Packs/day: 1.00     Years: 17.00     Types: Cigarettes     Start date: 9/2/1999   • Smokeless tobacco: Never Used   • Alcohol use 1.8 oz/week     3 Standard drinks or equivalent per week   • Drug use: No      Comment: meth    • Sexual activity: Yes       SURGICAL HISTORY   has a past surgical history that includes external fixator application (Left, 9/1/2016); tibia plateau orif (Left, 9/2/2016); and umbilical hernia repair (9/13/2017).    CURRENT MEDICATIONS  Home Medications    **Home medications have not yet been reviewed for this encounter**         ALLERGIES  Allergies   Allergen Reactions   • Apricot Flavor Anaphylaxis     Throat swells   • Food Anaphylaxis     Nectarine: Throat swells   • Peach [Prunus Persica] Anaphylaxis     Throat swells       PHYSICAL EXAM  VITAL SIGNS: /71   Pulse 81   Temp 37 °C (98.6 °F)   Resp 16   Ht 1.727 m (5' 8\")   Wt 81.6 " kg (180 lb)   SpO2 98%   BMI 27.37 kg/m²   Pulse ox interpretation: I interpret this pulse ox as normal.  Constitutional: Alert in no apparent distress.  HENT: No signs of trauma, Bilateral external ears normal, Nose normal.   Eyes: Pupils are equal and reactive, Conjunctiva normal, Non-icteric.   Neck: Normal range of motion, No tenderness, Supple, No stridor.   Cardiovascular: Regular rate and rhythm, no murmurs.   Thorax & Lungs: Normal breath sounds, No respiratory distress, No wheezing, No chest tenderness.   Abdomen: Bowel sounds normal, Soft, No tenderness, No masses, No pulsatile masses. No peritoneal signs.  Skin: Warm, Dry, No erythema, No rash.   Back: No bony tenderness, No CVA tenderness.   Extremities: Intact distal pulses, No edema, No tenderness, No cyanosis  Musculoskeletal: Good range of motion in all major joints. Old surgical scars to the left knee. No significant effusion or erythema. No acute deformity.  Neurologic: Alert,  Slurred speech, Normal motor function and gait, Normal sensory function, No focal deficits noted.       DIAGNOSTIC STUDIES / PROCEDURES    EKG  4/5/2018 at 3:17 PM  Normal sinus rhythm at 77  GA, QRS, QTC within normal limits  Normal axis  No T-wave abnormalities  No see elevations or depressions  No signs of acute ischemia or arrhythmia  No signs of Brugada or WPW    LABS  Labs Reviewed   CBC WITH DIFFERENTIAL - Abnormal; Notable for the following:        Result Value    RBC 4.62 (*)     All other components within normal limits    Narrative:     Indicate which anticoagulants the patient is on:->UNKNOWN   COMP METABOLIC PANEL - Abnormal; Notable for the following:     Potassium 3.5 (*)     All other components within normal limits    Narrative:     Indicate which anticoagulants the patient is on:->UNKNOWN   URINE DRUG SCREEN - Abnormal; Notable for the following:     Opiates Positive (*)     All other components within normal limits   DIAGNOSTIC ALCOHOL - Abnormal;  Notable for the following:     Diagnostic Alcohol 0.21 (*)     All other components within normal limits    Narrative:     Indicate which anticoagulants the patient is on:->UNKNOWN   TROPONIN    Narrative:     Indicate which anticoagulants the patient is on:->UNKNOWN   PROTHROMBIN TIME    Narrative:     Indicate which anticoagulants the patient is on:->UNKNOWN   ESTIMATED GFR    Narrative:     Indicate which anticoagulants the patient is on:->UNKNOWN   TROPONIN       RADIOLOGY  DX-CHEST-PORTABLE (1 VIEW)   Final Result         1. No acute cardiopulmonary abnormalities are identified.            COURSE & MEDICAL DECISION MAKING  Pertinent Labs & Imaging studies reviewed. (See chart for details)  37 y.o.  Male presenting after a syncopal event earlier today. States she was drinking several shots of liquor and taking Vicodin for his chronic left knee pain. Has had multiple surgeries to his knee. Struggling with controlling his pain symptoms.     denies prior cardiac history. Does have history of syncope recently. EKG without signs of arrhythmia. Patient is resting comfortably here. No signs of significant trauma. Laboratory studies were performed. 2 troponins studies were negative. Does have epigastric and chest pain. Secondary to alcoholic gastritis given the negative EKG and troponin studies were unremarkable x-ray. Unlikely ACS related. Heart score of 1 given risk factors Of smoking and cholesterol.    Patient was instructed to follow up with his primary care physician for further management. May benefit from pain management with regards to his left chronic knee pain. He was instructed regarding the dangers of binge drinking and mixing alcohol with his chronic pain medications.  This may have contributed to his syncopal episode today. He may require follow-up with a cardiologist however for Holter monitoring or further testing to evaluate possible causes of his syncopal events. No significant or joint abnormality.  "No significant anemia.    Patient was discharged home in stable condition. To follow up with primary care physician for further management.    The patient will not drink alcohol nor drive with prescribed medications.   The patient will return for worsening symptoms or failure of improvement and is stable at the time of discharge. The patient verbalizes understanding in their own words.    /71   Pulse 78   Temp 37 °C (98.6 °F)   Resp 13   Ht 1.727 m (5' 8\")   Wt 81.6 kg (180 lb)   SpO2 94%   BMI 27.37 kg/m²     The patient was referred to primary care where they will receive further BP management.      Pcp Pt States None    In 2 days      [facility], Emergency Dept  [address] [address] [phone]    As needed, If symptoms worsen    [facility]  [facility]  [place] [address]  [phone]          FINAL IMPRESSION  1. Syncope, unspecified syncope type    2. Alcoholic intoxication without complication (CMS-HCC)            Electronically signed by: Carlos Hay, 4/5/2018 4:00 PM    "

## 2018-04-06 NOTE — DISCHARGE INSTRUCTIONS
Syncope  Syncope is when you temporarily lose consciousness. Syncope may also be called fainting or passing out. It is caused by a sudden decrease in blood flow to the brain. Even though most causes of syncope are not dangerous, syncope can be a sign of a serious medical problem. Signs that you may be about to faint include:  · Feeling dizzy or light-headed.  · Feeling nauseous.  · Seeing all white or all black in your field of vision.  · Having cold, clammy skin.  If you fainted, get medical help right away.Call your local emergency services (911 in the [place] Do not drive yourself to the hospital.  Follow these instructions at home:  Pay attention to any changes in your symptoms. Take these actions to help with your condition:  · Have someone stay with you until you feel stable.  · Do not drive, use machinery, or play sports until your health care provider says it is okay.  · Keep all follow-up visits as told by your health care provider. This is important.  · If you start to feel like you might faint, lie down right away and raise (elevate) your feet above the level of your heart. Breathe deeply and steadily. Wait until all of the symptoms have passed.  · Drink enough fluid to keep your urine clear or pale yellow.  · If you are taking blood pressure or heart medicine, get up slowly and take several minutes to sit and then stand. This can reduce dizziness.  · Take over-the-counter and prescription medicines only as told by your health care provider.  Get help right away if:  · You have a severe headache.  · You have unusual pain in your chest, abdomen, or back.  · You are bleeding from your mouth or rectum, or you have black or tarry stool.  · You have a very fast or irregular heartbeat (palpitations).  · You have pain with breathing.  · You faint once or repeatedly.  · You have a seizure.  · You are confused.  · You have trouble walking.  · You have severe weakness.  · You have vision problems.  These symptoms  may represent a serious problem that is an emergency. Do not wait to see if your symptoms will go away. Get medical help right away. Call your local emergency services (911 in the [place] Do not drive yourself to the hospital.   This information is not intended to replace advice given to you by your health care provider. Make sure you discuss any questions you have with your health care provider.  Document Released: 12/18/2006 Document Revised: 05/25/2017 Document Reviewed: 08/31/2016  Elsevier Interactive Patient Education © 2017 [employer]

## 2019-05-13 ENCOUNTER — TELEPHONE (OUTPATIENT)
Dept: SCHEDULING | Facility: IMAGING CENTER | Age: 39
End: 2019-05-13

## 2019-05-22 ENCOUNTER — HOSPITAL ENCOUNTER (EMERGENCY)
Dept: HOSPITAL 8 - ED | Age: 39
Discharge: HOME | End: 2019-05-22
Payer: MEDICAID

## 2019-05-22 VITALS — BODY MASS INDEX: 29.4 KG/M2 | HEIGHT: 68 IN | WEIGHT: 194.01 LBS

## 2019-05-22 DIAGNOSIS — F17.200: ICD-10-CM

## 2019-05-22 DIAGNOSIS — M25.562: Primary | ICD-10-CM

## 2019-05-22 PROCEDURE — 99284 EMERGENCY DEPT VISIT MOD MDM: CPT

## 2019-06-10 ENCOUNTER — TELEPHONE (OUTPATIENT)
Dept: MEDICAL GROUP | Facility: MEDICAL CENTER | Age: 39
End: 2019-06-10

## 2019-06-10 NOTE — TELEPHONE ENCOUNTER
Left message with patient about no show to appointment today 6/10/19.  Explained this was his first no show and the no show policy.

## 2019-12-02 ENCOUNTER — TELEPHONE (OUTPATIENT)
Dept: URGENT CARE | Facility: CLINIC | Age: 39
End: 2019-12-02

## 2019-12-02 ENCOUNTER — APPOINTMENT (OUTPATIENT)
Dept: RADIOLOGY | Facility: IMAGING CENTER | Age: 39
End: 2019-12-02
Attending: NURSE PRACTITIONER
Payer: MEDICAID

## 2019-12-02 ENCOUNTER — OFFICE VISIT (OUTPATIENT)
Dept: URGENT CARE | Facility: CLINIC | Age: 39
End: 2019-12-02
Payer: MEDICAID

## 2019-12-02 VITALS
BODY MASS INDEX: 28.43 KG/M2 | SYSTOLIC BLOOD PRESSURE: 108 MMHG | WEIGHT: 187 LBS | DIASTOLIC BLOOD PRESSURE: 62 MMHG | OXYGEN SATURATION: 98 % | HEART RATE: 72 BPM | TEMPERATURE: 97.9 F

## 2019-12-02 DIAGNOSIS — M25.562 ACUTE PAIN OF LEFT KNEE: ICD-10-CM
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
## 2019-12-02 PROCEDURE — 99203 OFFICE O/P NEW LOW 30 MIN: CPT | Performed by: NURSE PRACTITIONER

## 2019-12-02 PROCEDURE — 73564 X-RAY EXAM KNEE 4 OR MORE: CPT | Mod: TC,LT | Performed by: FAMILY MEDICINE

## 2019-12-02 SDOH — HEALTH STABILITY: MENTAL HEALTH: HOW OFTEN DO YOU HAVE A DRINK CONTAINING ALCOHOL?: 2-4 TIMES A MONTH

## 2019-12-02 ASSESSMENT — ENCOUNTER SYMPTOMS
SENSORY CHANGE: 0
TINGLING: 0
FOCAL WEAKNESS: 0

## 2019-12-02 NOTE — PROGRESS NOTES
Subjective:      Rosanna Danielle is a 39 y.o. male who presents with Knee Injury (48 hrs ago he fell on ice onto his knee)            HPI New. 39 year old male with history of left tibial plateau fracture presents with 2 day history of left knee pain after falling on ice. He as pain over the tibial plateau. Worse with weight bearing. No abrasion or swelling noted, no bruising. He has not done any treatment for this. Denies ankle or hip pain.  Apricot flavor; Food; and Peach [prunus persica]  Current Outpatient Medications on File Prior to Visit   Medication Sig Dispense Refill   • VALERIAN ROOT PO Take 1 Tab by mouth every day.     • ASHWAGANDHA PO Take 1 Tab by mouth every day.     • Ascorbic Acid (VITAMIN C PO) Take 1 Tab by mouth every day.     • PYRIDOXINE HCL PO Take 1 Tab by mouth every day.     • Cholecalciferol 1000 UNIT Cap Take 1,000 Units by mouth every day.     • cyanocobalamin (VITAMIN B-12) 100 MCG Tab Take 100 mcg by mouth every day.     • Cyanocobalamin (VITAMIN B 12 PO) Take 1 Tab by mouth every day at 6 PM.       No current facility-administered medications on file prior to visit.      Social History     Socioeconomic History   • Marital status:      Spouse name: Not on file   • Number of children: Not on file   • Years of education: Not on file   • Highest education level: Not on file   Occupational History   • Not on file   Social Needs   • Financial resource strain: Not on file   • Food insecurity:     Worry: Not on file     Inability: Not on file   • Transportation needs:     Medical: Not on file     Non-medical: Not on file   Tobacco Use   • Smoking status: Current Every Day Smoker     Packs/day: 1.00     Years: 17.00     Pack years: 17.00     Types: Cigarettes     Start date: 9/2/1999   • Smokeless tobacco: Never Used   Substance and Sexual Activity   • Alcohol use: Yes     Alcohol/week: 1.8 oz     Types: 3 Standard drinks or equivalent per week     Frequency: 2-4 times a month   •  Drug use: No     Comment: meth    • Sexual activity: Yes     Partners: Female   Lifestyle   • Physical activity:     Days per week: Not on file     Minutes per session: Not on file   • Stress: Not on file   Relationships   • Social connections:     Talks on phone: Not on file     Gets together: Not on file     Attends Bahai service: Not on file     Active member of club or organization: Not on file     Attends meetings of clubs or organizations: Not on file     Relationship status: Not on file   • Intimate partner violence:     Fear of current or ex partner: Not on file     Emotionally abused: Not on file     Physically abused: Not on file     Forced sexual activity: Not on file   Other Topics Concern   • Not on file   Social History Narrative   • Not on file     Breast Cancer-related family history is not on file.      Review of Systems   Musculoskeletal: Positive for joint pain.   Neurological: Negative for tingling, sensory change and focal weakness.          Objective:     /62   Pulse 72   Temp 36.6 °C (97.9 °F)   Wt 84.8 kg (187 lb)   SpO2 98%   BMI 28.43 kg/m²      Physical Exam  Cardiovascular:      Rate and Rhythm: Regular rhythm.      Heart sounds: No murmur.   Pulmonary:      Effort: Pulmonary effort is normal.      Breath sounds: Normal breath sounds.   Musculoskeletal:      Left knee: He exhibits decreased range of motion. He exhibits no swelling. Tenderness found.        Legs:    Skin:     General: Skin is warm.      Findings: No bruising.   Neurological:      General: No focal deficit present.      Mental Status: He is alert and oriented to person, place, and time.   Psychiatric:         Mood and Affect: Mood normal.                 Assessment/Plan:     1. Acute pain of left knee  DX-KNEE COMPLETE 4+ LEFT    REFERRAL TO ORTHOPEDICS   2. Fall due to slipping on ice or snow, initial encounter       Imaging negative.  Rest/ice/ibuprofen  Referral to ortho.  Differential diagnosis, natural  history, supportive care, and indications for immediate follow-up discussed at length.

## 2020-01-05 ENCOUNTER — HOSPITAL ENCOUNTER (EMERGENCY)
Dept: HOSPITAL 8 - ED | Age: 40
Discharge: HOME | End: 2020-01-05
Payer: MEDICAID

## 2020-01-05 VITALS — HEIGHT: 68 IN | BODY MASS INDEX: 30.27 KG/M2 | WEIGHT: 199.74 LBS

## 2020-01-05 VITALS — DIASTOLIC BLOOD PRESSURE: 57 MMHG | SYSTOLIC BLOOD PRESSURE: 101 MMHG

## 2020-01-05 DIAGNOSIS — M79.5: ICD-10-CM

## 2020-01-05 DIAGNOSIS — F17.200: ICD-10-CM

## 2020-01-05 DIAGNOSIS — L03.012: Primary | ICD-10-CM

## 2020-01-05 PROCEDURE — 99284 EMERGENCY DEPT VISIT MOD MDM: CPT

## 2022-07-08 ENCOUNTER — HOSPITAL ENCOUNTER (EMERGENCY)
Facility: MEDICAL CENTER | Age: 42
End: 2022-07-08
Attending: EMERGENCY MEDICINE
Payer: COMMERCIAL

## 2022-07-08 VITALS
DIASTOLIC BLOOD PRESSURE: 75 MMHG | RESPIRATION RATE: 16 BRPM | TEMPERATURE: 97.6 F | OXYGEN SATURATION: 98 % | BODY MASS INDEX: 29.3 KG/M2 | SYSTOLIC BLOOD PRESSURE: 120 MMHG | HEIGHT: 68 IN | HEART RATE: 68 BPM | WEIGHT: 193.34 LBS

## 2022-07-08 DIAGNOSIS — Z91.89 AT RISK FOR SEXUALLY TRANSMITTED DISEASE DUE TO UNPROTECTED SEX: ICD-10-CM

## 2022-07-08 LAB
APPEARANCE UR: CLEAR
BILIRUB UR QL STRIP.AUTO: ABNORMAL
C TRACH DNA SPEC QL NAA+PROBE: NEGATIVE
COLOR UR: YELLOW
GLUCOSE UR STRIP.AUTO-MCNC: NEGATIVE MG/DL
HIV 1+2 AB+HIV1 P24 AG SERPL QL IA: NORMAL
KETONES UR STRIP.AUTO-MCNC: ABNORMAL MG/DL
LEUKOCYTE ESTERASE UR QL STRIP.AUTO: NEGATIVE
MICRO URNS: ABNORMAL
N GONORRHOEA DNA SPEC QL NAA+PROBE: NEGATIVE
NITRITE UR QL STRIP.AUTO: NEGATIVE
PH UR STRIP.AUTO: 6 [PH] (ref 5–8)
PROT UR QL STRIP: NEGATIVE MG/DL
RBC UR QL AUTO: NEGATIVE
SP GR UR STRIP.AUTO: 1.02
SPECIMEN SOURCE: NORMAL
T PALLIDUM AB SER QL IA: NORMAL
UROBILINOGEN UR STRIP.AUTO-MCNC: 0.2 MG/DL

## 2022-07-08 PROCEDURE — 81003 URINALYSIS AUTO W/O SCOPE: CPT

## 2022-07-08 PROCEDURE — 86780 TREPONEMA PALLIDUM: CPT

## 2022-07-08 PROCEDURE — 36415 COLL VENOUS BLD VENIPUNCTURE: CPT

## 2022-07-08 PROCEDURE — 87591 N.GONORRHOEAE DNA AMP PROB: CPT

## 2022-07-08 PROCEDURE — 99283 EMERGENCY DEPT VISIT LOW MDM: CPT

## 2022-07-08 PROCEDURE — 87389 HIV-1 AG W/HIV-1&-2 AB AG IA: CPT

## 2022-07-08 PROCEDURE — 87491 CHLMYD TRACH DNA AMP PROBE: CPT

## 2022-07-08 NOTE — ED NOTES
Pt aox4, vss, nad, ambulatory steady  Understood all dc info and when to seek medical care, no further questions

## 2022-07-08 NOTE — ED PROVIDER NOTES
"ED Provider Note    CHIEF COMPLAINT  Chief Complaint   Patient presents with   • Exposure to STD     Wants to be tested    • Urinating Blood     On and off 2 wks ago.         HPI  Rosanna Danielle is a 42 y.o. male who presents requesting STI testing.  He has a history of syphilis gonorrhea and chlamydia, currently has no symptoms whatsoever but is here with his sexual partner who is also requesting STI testing.  Denies penile discharge or penile pain, no rashes or lesions on his genitals.  He states that he intermittently experiences hematuria when he uses drugs but this is not consistent.  No back pain.  No other acute complaints.    REVIEW OF SYSTEMS  Negative for fever, rash, chest pain, dyspnea, abdominal pain, back pain. All other systems are negative.     PAST MEDICAL HISTORY   has a past medical history of Bronchitis and High cholesterol.    SOCIAL HISTORY  Social History     Tobacco Use   • Smoking status: Current Every Day Smoker     Packs/day: 1.00     Years: 17.00     Pack years: 17.00     Types: Cigarettes     Start date: 9/2/1999   • Smokeless tobacco: Never Used   Vaping Use   • Vaping Use: Never used   Substance and Sexual Activity   • Alcohol use: Yes     Alcohol/week: 1.8 oz     Types: 3 Standard drinks or equivalent per week   • Drug use: No     Comment: meth    • Sexual activity: Yes     Partners: Female       SURGICAL HISTORY   has a past surgical history that includes external fixator application (Left, 9/1/2016); orif, fracture, tibia, plateau (Left, 9/2/2016); and umbilical hernia repair (9/13/2017).    CURRENT MEDICATIONS  I personally reviewed the medication list in the charting documentation.     ALLERGIES  No Known Allergies    PHYSICAL EXAM  VITAL SIGNS: /72   Pulse 73   Temp 36.2 °C (97.2 °F) (Temporal)   Resp 18   Ht 1.727 m (5' 8\")   Wt 87.7 kg (193 lb 5.5 oz)   SpO2 97%   BMI 29.40 kg/m²   Constitutional: Well appearing patient in no acute distress.  Awake and alert, " not toxic nor ill in appearance.  HENT: Normocephalic, no obvious evidence of acute trauma.   Neck: Comfortable movement without any obvious restriction in the range of motion.  Eyes: Conjunctiva normal, Non-icteric.   Chest: Normal nonlabored respirations.  Skin: The exposed portions of skin reveal no obvious rash or other abnormalities.  Musculoskeletal: No obvious restriction in the range of motion in all major joints.   Neurologic: Alert, No obvious focal deficits noted.   Psychiatric: Affect normal for clinical presentation    DIAGNOSTIC STUDIES / PROCEDURES    LABS/EKGs  Results for orders placed or performed during the hospital encounter of 07/08/22   Chlamydia/GC, PCR (Urine)    Specimen: Urine   Result Value Ref Range    Source Urine    T. Pallidum AB EIA (Syphilis)   Result Value Ref Range    Syphilis, Treponemal Qual Non-Reactive Non-Reactive   HIV AG/AB Combo Assay Screening   Result Value Ref Range    HIV Ag/Ab Combo Assay Non-Reactive Non Reactive   URINALYSIS    Specimen: Urine   Result Value Ref Range    Color Yellow     Character Clear     Specific Gravity 1.025 <1.035    Ph 6.0 5.0 - 8.0    Glucose Negative Negative mg/dL    Ketones Trace (A) Negative mg/dL    Protein Negative Negative mg/dL    Bilirubin Small (A) Negative    Urobilinogen, Urine 0.2 Negative    Nitrite Negative Negative    Leukocyte Esterase Negative Negative    Occult Blood Negative Negative    Micro Urine Req see below         COURSE & MEDICAL DECISION MAKING  Pertinent Labs & Imaging studies reviewed. (See chart for details)    Encounter Summary: This is a very pleasant 42 y.o. male who presents here requesting STI testing.  History of gonorrhea and chlamydia and syphilis.  No acute complaints related to STIs, here he tested negative for HIV and syphilis.  GC and Chlamydia testing are pending and will be followed up on by our pharmacy staff if they result positive.  He is not symptomatic so no need to treat him now.  He also  complained of intermittent hematuria only when he uses meth, urinalysis here is clear.  Discharged home in stable condition    DISPOSITION: Discharge Home      FINAL IMPRESSION  1. At risk for sexually transmitted disease due to unprotected sex        This dictation was created using voice recognition software. The accuracy of the dictation is limited to the abilities of the software. I expect there may be some errors of grammar and possibly content. The nursing notes were reviewed and certain aspects of this information were incorporated into this note.    Electronically signed by: Norm Ball M.D., 7/8/2022 3:04 PM

## 2022-07-08 NOTE — ED TRIAGE NOTES
Pt ambulated to triage with   Chief Complaint   Patient presents with   • Exposure to STD     Wants to be tested    • Urinating Blood     On and off 2 wks ago.         Protocol ordered.  Pt Informed regarding triage process and verbalized understanding to inform triage tech or RN for any changes in condition. Placed in lobby.

## 2022-09-22 ENCOUNTER — HOSPITAL ENCOUNTER (EMERGENCY)
Facility: MEDICAL CENTER | Age: 42
End: 2022-09-22
Attending: EMERGENCY MEDICINE
Payer: COMMERCIAL

## 2022-09-22 ENCOUNTER — APPOINTMENT (OUTPATIENT)
Dept: RADIOLOGY | Facility: MEDICAL CENTER | Age: 42
End: 2022-09-22
Attending: EMERGENCY MEDICINE
Payer: COMMERCIAL

## 2022-09-22 VITALS
HEART RATE: 76 BPM | HEIGHT: 68 IN | WEIGHT: 203.04 LBS | TEMPERATURE: 98.4 F | OXYGEN SATURATION: 98 % | RESPIRATION RATE: 12 BRPM | DIASTOLIC BLOOD PRESSURE: 66 MMHG | BODY MASS INDEX: 30.77 KG/M2 | SYSTOLIC BLOOD PRESSURE: 114 MMHG

## 2022-09-22 DIAGNOSIS — M25.562 ACUTE PAIN OF LEFT KNEE: ICD-10-CM

## 2022-09-22 PROCEDURE — 73564 X-RAY EXAM KNEE 4 OR MORE: CPT | Mod: LT

## 2022-09-22 PROCEDURE — 99283 EMERGENCY DEPT VISIT LOW MDM: CPT

## 2022-09-22 NOTE — ED PROVIDER NOTES
ED Provider Note    CHIEF COMPLAINT  Chief Complaint   Patient presents with    Knee Pain     Patient has had knee pain since 2016 and is seeking a referral for orthopedics, Specifically to [facility] for [place] due to Medicaid       HPI  Rosanna Danielle is a 42 y.o. male who presents history of chronic left knee pain, he reports having knee pain since 2016 and is here saying that he twisted his knee and fell today causing the pain to become worse.  He denies any other injuries.  No head injury, no neck pain.    REVIEW OF SYSTEMS  See HPI for further details. All other systems are negative.     PAST MEDICAL HISTORY   has a past medical history of Bronchitis and High cholesterol.    SOCIAL HISTORY  Social History     Tobacco Use    Smoking status: Every Day     Packs/day: 1.00     Years: 17.00     Pack years: 17.00     Types: Cigarettes     Start date: 9/2/1999    Smokeless tobacco: Never   Vaping Use    Vaping Use: Never used   Substance and Sexual Activity    Alcohol use: Yes     Alcohol/week: 1.8 oz     Types: 3 Standard drinks or equivalent per week    Drug use: No     Comment: meth     Sexual activity: Yes     Partners: Female       SURGICAL HISTORY   has a past surgical history that includes external fixator application (Left, 9/1/2016); orif, fracture, tibia, plateau (Left, 9/2/2016); and umbilical hernia repair (9/13/2017).    CURRENT MEDICATIONS  Home Medications       Reviewed by Deirdre Sorensen R.N. (Registered Nurse) on 09/22/22 at 1354  Med List Status: Partial     Medication Last Dose Status   Ascorbic Acid (VITAMIN C PO)  Active   ASHWAGANDHA PO  Active   Cholecalciferol 1000 UNIT Cap  Active   Cyanocobalamin (VITAMIN B 12 PO)  Active   cyanocobalamin (VITAMIN B-12) 100 MCG Tab  Active   PYRIDOXINE HCL PO  Active   VALERIAN ROOT PO  Active                    ALLERGIES  No Known Allergies    FAMILY HISTORY  No pertinent family history    PHYSICAL EXAM  VITAL SIGNS: /66   Pulse 77   Temp 36.6  "°C (97.8 °F) (Temporal)   Resp 14   Ht 1.727 m (5' 8\")   Wt 92.1 kg (203 lb 0.7 oz)   SpO2 98%   BMI 30.87 kg/m²  @[id number]::@   Pulse ox interpretation: I interpret this pulse ox as normal.  Constitutional: Alert in no apparent distress.  HENT: No signs of trauma, Bilateral external ears normal, Nose normal.   Eyes: Pupils are equal and reactive, Conjunctiva normal, Non-icteric.   Neck: Normal range of motion, No tenderness, Supple, No stridor.   Extremities: Intact distal pulses, the patient initially is in a left knee brace, the brace was removed and he has no obvious deformity.  Musculoskeletal: Knee tenderness on the left, no obvious deformity.  Neurologic: Alert , Normal motor function, Normal sensory function, No focal deficits noted.   Psychiatric: Affect normal, Judgment normal, Mood normal.       DIAGNOSTIC STUDIES / PROCEDURES      RADIOLOGY  DX-KNEE COMPLETE 4+ LEFT   Final Result      1.  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  Osteoarthritis, similar to prior              COURSE & MEDICAL DECISION MAKING  Pertinent Labs & Imaging studies reviewed. (See chart for details)    The patient presents with acute on chronic knee pain after trauma.  X-ray was ordered to evaluate.    X-ray demonstrates no acute disease.  The patient is referred to the on-call orthopedist Dr. Goldman.  He will return for worsening symptoms.    The patient will return for new or worsening symptoms and is stable at the time of discharge.    The patient is referred to a primary physician for blood pressure management, diabetic screening, and for all other preventative health concerns.      DISPOSITION:  Patient will be discharged home in stable condition.    FOLLOW UP:  [facility], Emergency Dept  [address] [address] [phone]  Follow up  If symptoms worsen    Marcin Laws M.D.  [address] [place] " [address]  [phone]    Schedule an appointment as soon as possible for a visit        OUTPATIENT MEDICATIONS:  Discharge Medication List as of 9/22/2022  3:37 PM        The patient will return for worsening symptoms and is stable at the time of discharge. The patient verbalizes understanding and will comply.    FINAL IMPRESSION  1. Acute pain of left knee                   Electronically signed by: Michael Bates M.D., 9/22/2022 2:47 PM

## 2022-09-22 NOTE — ED TRIAGE NOTES
"Chief Complaint   Patient presents with    Knee Pain     Patient has had knee pain since 2016 and is seeking a referral for orthopedics, Specifically to [facility] [place]       Patient to triage ambulatory with a steady gait, AAOx4, Appropriate precautions in place.     Explained wait time and triage process. Placed back in lobby. Told to notify ED tech or RN of any changes, verbalized understanding.    /66   Pulse 77   Temp 36.6 °C (97.8 °F) (Temporal)   Resp 14   Ht 1.727 m (5' 8\")   Wt 92.1 kg (203 lb 0.7 oz)   SpO2 98%   BMI 30.87 kg/m²     "

## 2023-01-13 ENCOUNTER — HOSPITAL ENCOUNTER (OUTPATIENT)
Dept: RADIOLOGY | Facility: MEDICAL CENTER | Age: 43
End: 2023-01-13
Attending: ORTHOPAEDIC SURGERY
Payer: MEDICAID

## 2023-01-13 DIAGNOSIS — M12.562 TRAUMATIC ARTHRITIS OF LEFT KNEE: ICD-10-CM

## 2023-01-13 PROCEDURE — 73721 MRI JNT OF LWR EXTRE W/O DYE: CPT

## 2023-01-16 ENCOUNTER — HOSPITAL ENCOUNTER (OUTPATIENT)
Facility: MEDICAL CENTER | Age: 43
End: 2023-01-16
Attending: ORTHOPAEDIC SURGERY
Payer: MEDICAID

## 2023-01-16 LAB
APPEARANCE FLD: NORMAL
BASOPHILS NFR FLD: 1 %
BODY FLD TYPE: NORMAL
COLOR FLD: NORMAL
EOSINOPHIL NFR FLD: 3 %
GRAM STN SPEC: NORMAL
LYMPHOCYTES NFR FLD: 41 %
MONONUC CELLS NFR FLD: 20 %
NEUTROPHILS NFR FLD: 35 %
RBC # FLD: NORMAL CELLS/UL
SIGNIFICANT IND 70042: NORMAL
SITE SITE: NORMAL
SOURCE SOURCE: NORMAL
WBC # FLD: 257 CELLS/UL

## 2023-01-16 PROCEDURE — 87205 SMEAR GRAM STAIN: CPT

## 2023-01-16 PROCEDURE — 87075 CULTR BACTERIA EXCEPT BLOOD: CPT

## 2023-01-16 PROCEDURE — 89051 BODY FLUID CELL COUNT: CPT

## 2023-01-16 PROCEDURE — 87070 CULTURE OTHR SPECIMN AEROBIC: CPT

## 2023-01-19 LAB
BACTERIA FLD AEROBE CULT: NORMAL
GRAM STN SPEC: NORMAL
SIGNIFICANT IND 70042: NORMAL
SITE SITE: NORMAL
SOURCE SOURCE: NORMAL

## 2023-01-21 LAB
BACTERIA SPEC ANAEROBE CULT: NORMAL
SIGNIFICANT IND 70042: NORMAL
SITE SITE: NORMAL
SOURCE SOURCE: NORMAL

## 2023-07-08 ENCOUNTER — APPOINTMENT (OUTPATIENT)
Dept: RADIOLOGY | Facility: MEDICAL CENTER | Age: 43
End: 2023-07-08
Attending: EMERGENCY MEDICINE
Payer: MEDICAID

## 2023-07-08 ENCOUNTER — HOSPITAL ENCOUNTER (EMERGENCY)
Facility: MEDICAL CENTER | Age: 43
End: 2023-07-08
Attending: EMERGENCY MEDICINE
Payer: MEDICAID

## 2023-07-08 VITALS
BODY MASS INDEX: 29.57 KG/M2 | HEIGHT: 68 IN | SYSTOLIC BLOOD PRESSURE: 113 MMHG | TEMPERATURE: 98.3 F | DIASTOLIC BLOOD PRESSURE: 77 MMHG | RESPIRATION RATE: 16 BRPM | HEART RATE: 77 BPM | OXYGEN SATURATION: 96 % | WEIGHT: 195.11 LBS

## 2023-07-08 DIAGNOSIS — S91.302A WOUND OF LEFT FOOT: ICD-10-CM

## 2023-07-08 PROCEDURE — 99283 EMERGENCY DEPT VISIT LOW MDM: CPT

## 2023-07-08 PROCEDURE — 73630 X-RAY EXAM OF FOOT: CPT | Mod: LT

## 2023-07-08 NOTE — ED PROVIDER NOTES
ED Provider Note    CHIEF COMPLAINT  Chief Complaint   Patient presents with    Wound Check     Patient reports stepping on a drain plug on Thursday with his left foot. Small cut to the bottom of the left foot. Patient requesting a note so he can return to work.        EXTERNAL RECORDS REVIEWED  Other last seen at [facility] for left knee pain.  Seen at [facility] for right leg swelling in March 2023.    HPI/ROS  LIMITATION TO HISTORY   Select: : None  OUTSIDE HISTORIAN(S):  Significant other      Rosanna Danielle is a 43 y.o. male who presents for evaluation of an injury to his left foot.  Patient reports that he was in the shower and there is a drink like that has spikes on the bottom of it that he stepped on resulting in the injury.  He states that his girlfriend had to pull it out of his foot.  He is concerned that it may be infected.  They deny any possibility of residual foreign body.  Patient reports that it came out in 1 piece.  He denies any redness, swelling, drainage, fevers, or other symptoms.  He reports that his last tetanus vaccine was about a week ago, and that he is up-to-date.    PAST MEDICAL HISTORY   has a past medical history of Bronchitis and High cholesterol.    SURGICAL HISTORY   has a past surgical history that includes external fixator application (Left, 9/1/2016); orif, fracture, tibia, plateau (Left, 9/2/2016); and umbilical hernia repair (9/13/2017).    FAMILY HISTORY  Family History   Problem Relation Age of Onset    Alcohol/Drug Mother     Diabetes Maternal Aunt     Heart Disease Paternal Aunt     Arthritis Maternal Grandmother     Cancer Maternal Grandmother     Hypertension Maternal Grandmother     Hyperlipidemia Maternal Grandmother     Stroke Maternal Grandmother     Arthritis Paternal Grandmother        SOCIAL HISTORY  Social History     Tobacco Use    Smoking status: Every Day     Packs/day: 1.00     Years: 17.00     Pack years: 17.00     Types: Cigarettes     Start  "date: 9/2/1999    Smokeless tobacco: Never   Vaping Use    Vaping Use: Never used   Substance and Sexual Activity    Alcohol use: Yes     Alcohol/week: 1.8 oz     Types: 3 Standard drinks or equivalent per week    Drug use: No     Comment: meth     Sexual activity: Yes     Partners: Female       CURRENT MEDICATIONS  Home Medications    **Home medications have not yet been reviewed for this encounter**         ALLERGIES  Allergies   Allergen Reactions    Keflex [Cephalexin]      Fever         PHYSICAL EXAM  VITAL SIGNS: /73   Pulse 75   Temp 36.8 °C (98.2 °F) (Temporal)   Resp 20   Ht 1.727 m (5' 8\")   Wt 88.5 kg (195 lb 1.7 oz)   SpO2 98%   BMI 29.67 kg/m²    Pulse ox interpretation: I interpret this pulse ox as normal.  Constitutional: Alert in no apparent distress.  HENT: Normocephalic, Atraumatic, Bilateral external ears normal. Nose normal.   Heart: Regular rate and rhythm  Lungs: Clear to auscultation bilaterally.  Skin: Warm, Dry  Musculoskeletal: Normal range of motion of all major joints. No deformity. Left plantar surface of the foot with small wound present. No erythema, drainage, or significant edema.   Neurologic: Alert, Grossly non-focal.       DIAGNOSTIC STUDIES / PROCEDURES    RADIOLOGY  I have independently interpreted the diagnostic imaging associated with this visit and am waiting the final reading from the radiologist.   My preliminary interpretation is as follows: No acute fracture or dislocation, no residual foreign body seen on x-ray  Radiologist interpretation:   DX-FOOT-COMPLETE 3+ LEFT   Final Result         No acute osseous abnormality.      No radiopaque foreign body identified.           COURSE & MEDICAL DECISION MAKING    ED Observation Status? No; Patient does not meet criteria for ED Observation.     INITIAL ASSESSMENT, COURSE AND PLAN  Care Narrative: 43-year-old man presents emergency department for evaluation of a foot injury.  On my examination he had a small wound " present, though this did not appear to have any evidence of infection.  Is concerned for possible bony injury given the amount of pain that the patient had with palpation of the area.  X-rays obtained showing no acute fracture or dislocation.  There was no residual foreign body present.  Discussed wound care with the patient and his significant other.  They were comfortable discharge home and will return for any new or worsening symptoms.  At this time the wound does not appear infected, but advised that should he develop any signs of infection it should be re-evaluated.  Tetanus is up-to-date, and he did not require a booster today.        ADDITIONAL PROBLEM LIST  Wound check  DISPOSITION AND DISCUSSIONS  Escalation of care considered, and ultimately not performed:after discussion with the patient / family, they have elected to decline an escalation in care    Barriers to care at this time, including but not limited to: Patient does not have established PCP.     Decision tools and prescription drugs considered including, but not limited to: Antibiotics not indicated    DISPOSITION:  Patient will be discharged home in stable condition.     FOLLOW UP:  [facility], Emergency Dept  [address] [address] [phone]    As needed      OUTPATIENT MEDICATIONS:  New Prescriptions    No medications on file       Caregiver was given return precautions and verbalizes understanding. They will return with patient for new or worsening symptoms.      FINAL DIAGNOSIS  1. Wound of left foot           Electronically signed by: Sharon Barron M.D., 7/8/2023 10:45 AM

## 2023-07-08 NOTE — ED TRIAGE NOTES
Rosanna Husam Danielle  43 y.o. male  Chief Complaint   Patient presents with    Wound Check     Patient reports stepping on a drain plug on Thursday with his left foot. Small cut to the bottom of the left foot. Patient requesting a note so he can return to work.        Vitals:    07/08/23 0955   BP: 111/73   Pulse: 75   Resp: 20   Temp: 36.8 °C (98.2 °F)   SpO2: 98%       Triage process explained to patient, apologized for wait time, and returned to lobby.  Pt informed to notify staff of any change in condition.

## 2023-07-08 NOTE — ED NOTES
Patient seen at bedside; they have no new complaints at this time and vital signs are stable. Patient given discharge paperwork and given opportunity to ask questions. Patient verbalized understanding of discharge instructions and return precautions. Patient ambulated from ED with steady gait; accompanied by family member.

## 2023-07-08 NOTE — Clinical Note
Rosanna Danielle was seen and treated in our emergency department on 7/8/2023.  He may return to work on 07/08/2023.       If you have any questions or concerns, please don't hesitate to call.      Sharon Barron M.D.

## 2025-02-17 ENCOUNTER — APPOINTMENT (OUTPATIENT)
Dept: RADIOLOGY | Facility: MEDICAL CENTER | Age: 45
End: 2025-02-17
Attending: EMERGENCY MEDICINE

## 2025-02-17 ENCOUNTER — HOSPITAL ENCOUNTER (EMERGENCY)
Facility: MEDICAL CENTER | Age: 45
End: 2025-02-17
Attending: EMERGENCY MEDICINE

## 2025-02-17 VITALS
OXYGEN SATURATION: 92 % | BODY MASS INDEX: 36.09 KG/M2 | HEART RATE: 108 BPM | SYSTOLIC BLOOD PRESSURE: 115 MMHG | HEIGHT: 68 IN | WEIGHT: 238.1 LBS | RESPIRATION RATE: 18 BRPM | TEMPERATURE: 98.1 F | DIASTOLIC BLOOD PRESSURE: 65 MMHG

## 2025-02-17 DIAGNOSIS — S09.90XA CLOSED HEAD INJURY, INITIAL ENCOUNTER: ICD-10-CM
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
## 2025-02-17 DIAGNOSIS — S20.221A CONTUSION OF RIGHT BACK WALL OF THORAX, INITIAL ENCOUNTER: ICD-10-CM

## 2025-02-17 DIAGNOSIS — S20.219A CONTUSION OF CHEST WALL, UNSPECIFIED LATERALITY, INITIAL ENCOUNTER: ICD-10-CM

## 2025-02-17 PROCEDURE — 72100 X-RAY EXAM L-S SPINE 2/3 VWS: CPT

## 2025-02-17 PROCEDURE — 73562 X-RAY EXAM OF KNEE 3: CPT | Mod: LT

## 2025-02-17 PROCEDURE — 99283 EMERGENCY DEPT VISIT LOW MDM: CPT

## 2025-02-17 PROCEDURE — 70450 CT HEAD/BRAIN W/O DYE: CPT

## 2025-02-17 PROCEDURE — 72070 X-RAY EXAM THORAC SPINE 2VWS: CPT

## 2025-02-17 RX ORDER — TRAMADOL HYDROCHLORIDE 50 MG/1
50 TABLET ORAL EVERY 4 HOURS PRN
Qty: 10 TABLET | Refills: 0 | Status: SHIPPED | OUTPATIENT
Start: 2025-02-17 | End: 2025-02-20
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
## 2025-05-30 NOTE — OP REPORT
Operative Report     Date: 9/13/2017    Surgeon: Karlene Ritchie M.D.     Assistant: MOHAN Yadav    Pre-operative Diagnosis: K42.9 Umbilical hernia without obstruction or gangrene     Post-operative Diagnosis: same     Procedure: 10787 Repair umbilical hernia, age 5 years or older; reducible     Procedure in detail: The patient was identified in the pre-operative holding area and brought to the operating room. Correct side and site were identified. Pre-operative antibiotics of ancef were administered prior to the procedure. GETA was smoothly induced. The patient was prepped and draped in the usual sterile fashion.     We made a supraumbilical curvelinear incision with the knife, and opened subcutaneous tissues with cautery until the hernia defect was encountered. The defect was dissected out circumferentially. A 4.3 cm ventralex mesh was sewed to repair the defect in an underlay fashion with 0 ethibond. I then closed the fascia over the mesh with interrupted 0 ethibond. The skin was closed in two layers with vicryl and the skin ran closed with subcuticular Monocryl.     The patient was awakened from general anesthetic, and was taken to the recovery room in stable condition.     Sponge and needle counts were correct at the end of the case.     Specimen: none     EBL: minimal     Dispo: stable, extubated, to PACU     Karlene Ritchie M.D.   [facility]   [phone]              
72

## 2025-08-31 ENCOUNTER — PHARMACY VISIT (OUTPATIENT)
Dept: PHARMACY | Facility: MEDICAL CENTER | Age: 45
End: 2025-08-31
Payer: COMMERCIAL

## 2025-08-31 PROCEDURE — RXMED WILLOW AMBULATORY MEDICATION CHARGE: Performed by: EMERGENCY MEDICINE

## (undated) DEVICE — GLOVE 7.0 LF PF PROTEXIS (50PR/BX)

## (undated) DEVICE — SODIUM CHL IRRIGATION 0.9% 1000ML (12EA/CA)

## (undated) DEVICE — NEEDLE NON SAFETY HYPO 22 GA X 1 1/2 IN (100/BX)

## (undated) DEVICE — SUTURE 0 ETHIBOND MO6 C/R - (12/BX) 8-18 INCH ETHICON

## (undated) DEVICE — BLADE SURGICAL #15 - (50/BX 3BX/CA)

## (undated) DEVICE — GLOVE BIOGEL SZ 6.5 SURGICAL PF LTX (50PR/BX 4BX/CA)

## (undated) DEVICE — CHLORAPREP 26 ML APPLICATOR - ORANGE TINT(25/CA)

## (undated) DEVICE — KIT ROOM DECONTAMINATION

## (undated) DEVICE — SUTURE 4-0 VICRYL PLUSFS-1 - 27 INCH (36/BX)

## (undated) DEVICE — DRAPE LAPAROTOMY T SHEET - (12EA/CA)

## (undated) DEVICE — GLOVE BIOGEL PI INDICATOR SZ 7.5 SURGICAL PF LF -(50/BX 4BX/CA)

## (undated) DEVICE — PACK MINOR BASIN - (2EA/CA)

## (undated) DEVICE — GLOVE BIOGEL SZ 8 SURGICAL PF LTX - (50PR/BX 4BX/CA)

## (undated) DEVICE — GLOVE, LITE (PAIR)

## (undated) DEVICE — PAD BABY LAP 4X18 W/O - RINGS PREWASHED 5/PK 40PK/CS

## (undated) DEVICE — SYRINGE 10 ML CONTROL LL (25EA/BX 4BX/CA)

## (undated) DEVICE — HEAD HOLDER JUNIOR/ADULT

## (undated) DEVICE — GLOVE SZ 7.5 LF PROTEXIS (50PR/BX)

## (undated) DEVICE — NEPTUNE 4 PORT MANIFOLD - (20/PK)

## (undated) DEVICE — SUTURE GENERAL

## (undated) DEVICE — ELECTRODE DUAL RETURN W/ CORD - (50/PK)

## (undated) DEVICE — TUBE E-T HI-LO CUFF 7.5MM (10EA/PK)